# Patient Record
Sex: FEMALE | Race: WHITE | NOT HISPANIC OR LATINO | URBAN - METROPOLITAN AREA
[De-identification: names, ages, dates, MRNs, and addresses within clinical notes are randomized per-mention and may not be internally consistent; named-entity substitution may affect disease eponyms.]

---

## 2017-06-11 ENCOUNTER — EMERGENCY (EMERGENCY)
Facility: HOSPITAL | Age: 47
LOS: 1 days | Discharge: HOME | End: 2017-06-11
Attending: EMERGENCY MEDICINE | Admitting: EMERGENCY MEDICINE
Payer: COMMERCIAL

## 2017-06-11 VITALS
TEMPERATURE: 98 F | OXYGEN SATURATION: 100 % | DIASTOLIC BLOOD PRESSURE: 79 MMHG | HEART RATE: 95 BPM | RESPIRATION RATE: 18 BRPM | SYSTOLIC BLOOD PRESSURE: 145 MMHG

## 2017-06-11 LAB
ALBUMIN SERPL ELPH-MCNC: 4 G/DL — SIGNIFICANT CHANGE UP (ref 3.3–5)
ALP SERPL-CCNC: 38 U/L — LOW (ref 40–120)
ALT FLD-CCNC: 10 U/L — SIGNIFICANT CHANGE UP (ref 4–33)
APPEARANCE UR: CLEAR — SIGNIFICANT CHANGE UP
AST SERPL-CCNC: 20 U/L — SIGNIFICANT CHANGE UP (ref 4–32)
BACTERIA # UR AUTO: SIGNIFICANT CHANGE UP
BASOPHILS # BLD AUTO: 0.05 K/UL — SIGNIFICANT CHANGE UP (ref 0–0.2)
BASOPHILS NFR BLD AUTO: 0.8 % — SIGNIFICANT CHANGE UP (ref 0–2)
BASOPHILS NFR SPEC: 2.6 % — HIGH (ref 0–2)
BILIRUB SERPL-MCNC: < 0.2 MG/DL — LOW (ref 0.2–1.2)
BILIRUB UR-MCNC: NEGATIVE — SIGNIFICANT CHANGE UP
BLOOD UR QL VISUAL: HIGH
BUN SERPL-MCNC: 19 MG/DL — SIGNIFICANT CHANGE UP (ref 7–23)
CALCIUM SERPL-MCNC: 9.1 MG/DL — SIGNIFICANT CHANGE UP (ref 8.4–10.5)
CHLORIDE SERPL-SCNC: 105 MMOL/L — SIGNIFICANT CHANGE UP (ref 98–107)
CO2 SERPL-SCNC: 24 MMOL/L — SIGNIFICANT CHANGE UP (ref 22–31)
COLOR SPEC: SIGNIFICANT CHANGE UP
CREAT SERPL-MCNC: 0.75 MG/DL — SIGNIFICANT CHANGE UP (ref 0.5–1.3)
EOSINOPHIL # BLD AUTO: 0.17 K/UL — SIGNIFICANT CHANGE UP (ref 0–0.5)
EOSINOPHIL NFR BLD AUTO: 2.6 % — SIGNIFICANT CHANGE UP (ref 0–6)
EOSINOPHIL NFR FLD: 3.5 % — SIGNIFICANT CHANGE UP (ref 0–6)
GIANT PLATELETS BLD QL SMEAR: PRESENT — SIGNIFICANT CHANGE UP
GLUCOSE SERPL-MCNC: 72 MG/DL — SIGNIFICANT CHANGE UP (ref 70–99)
GLUCOSE UR-MCNC: NEGATIVE — SIGNIFICANT CHANGE UP
HCG SERPL-ACNC: < 5 MIU/ML — SIGNIFICANT CHANGE UP
HCT VFR BLD CALC: 25.6 % — LOW (ref 34.5–45)
HGB BLD-MCNC: 7.4 G/DL — LOW (ref 11.5–15.5)
HYPOCHROMIA BLD QL: SIGNIFICANT CHANGE UP
IMM GRANULOCYTES NFR BLD AUTO: 0.2 % — SIGNIFICANT CHANGE UP (ref 0–1.5)
KETONES UR-MCNC: NEGATIVE — SIGNIFICANT CHANGE UP
LEUKOCYTE ESTERASE UR-ACNC: NEGATIVE — SIGNIFICANT CHANGE UP
LYMPHOCYTES # BLD AUTO: 2.12 K/UL — SIGNIFICANT CHANGE UP (ref 1–3.3)
LYMPHOCYTES # BLD AUTO: 32.9 % — SIGNIFICANT CHANGE UP (ref 13–44)
LYMPHOCYTES NFR SPEC AUTO: 23.7 % — SIGNIFICANT CHANGE UP (ref 13–44)
MCHC RBC-ENTMCNC: 21.1 PG — LOW (ref 27–34)
MCHC RBC-ENTMCNC: 28.9 % — LOW (ref 32–36)
MCV RBC AUTO: 72.9 FL — LOW (ref 80–100)
MICROCYTES BLD QL: SIGNIFICANT CHANGE UP
MONOCYTES # BLD AUTO: 0.53 K/UL — SIGNIFICANT CHANGE UP (ref 0–0.9)
MONOCYTES NFR BLD AUTO: 8.2 % — SIGNIFICANT CHANGE UP (ref 2–14)
MONOCYTES NFR BLD: 1.8 % — LOW (ref 2–9)
MUCOUS THREADS # UR AUTO: SIGNIFICANT CHANGE UP
NEUTROPHIL AB SER-ACNC: 65.8 % — SIGNIFICANT CHANGE UP (ref 43–77)
NEUTROPHILS # BLD AUTO: 3.57 K/UL — SIGNIFICANT CHANGE UP (ref 1.8–7.4)
NEUTROPHILS NFR BLD AUTO: 55.3 % — SIGNIFICANT CHANGE UP (ref 43–77)
NITRITE UR-MCNC: NEGATIVE — SIGNIFICANT CHANGE UP
OVALOCYTES BLD QL SMEAR: SLIGHT — SIGNIFICANT CHANGE UP
PH UR: 5.5 — SIGNIFICANT CHANGE UP (ref 4.6–8)
PLATELET # BLD AUTO: 403 K/UL — HIGH (ref 150–400)
PLATELET COUNT - ESTIMATE: NORMAL — SIGNIFICANT CHANGE UP
PMV BLD: 9 FL — SIGNIFICANT CHANGE UP (ref 7–13)
POLYCHROMASIA BLD QL SMEAR: SLIGHT — SIGNIFICANT CHANGE UP
POTASSIUM SERPL-MCNC: 3.7 MMOL/L — SIGNIFICANT CHANGE UP (ref 3.5–5.3)
POTASSIUM SERPL-SCNC: 3.7 MMOL/L — SIGNIFICANT CHANGE UP (ref 3.5–5.3)
PROT SERPL-MCNC: 7 G/DL — SIGNIFICANT CHANGE UP (ref 6–8.3)
PROT UR-MCNC: NEGATIVE — SIGNIFICANT CHANGE UP
RBC # BLD: 3.51 M/UL — LOW (ref 3.8–5.2)
RBC # FLD: 19.3 % — HIGH (ref 10.3–14.5)
RBC CASTS # UR COMP ASSIST: >50 — HIGH (ref 0–?)
SODIUM SERPL-SCNC: 144 MMOL/L — SIGNIFICANT CHANGE UP (ref 135–145)
SP GR SPEC: 1.01 — SIGNIFICANT CHANGE UP (ref 1–1.03)
SQUAMOUS # UR AUTO: SIGNIFICANT CHANGE UP
UROBILINOGEN FLD QL: NORMAL E.U. — SIGNIFICANT CHANGE UP (ref 0.1–0.2)
VARIANT LYMPHS # BLD: 2.6 % — SIGNIFICANT CHANGE UP
WBC # BLD: 6.45 K/UL — SIGNIFICANT CHANGE UP (ref 3.8–10.5)
WBC # FLD AUTO: 6.45 K/UL — SIGNIFICANT CHANGE UP (ref 3.8–10.5)
WBC UR QL: SIGNIFICANT CHANGE UP (ref 0–?)

## 2017-06-11 PROCEDURE — 99284 EMERGENCY DEPT VISIT MOD MDM: CPT | Mod: 25

## 2017-06-11 PROCEDURE — 76830 TRANSVAGINAL US NON-OB: CPT | Mod: 26

## 2017-06-11 PROCEDURE — 99245 OFF/OP CONSLTJ NEW/EST HI 55: CPT | Mod: GC

## 2017-06-11 RX ORDER — SODIUM CHLORIDE 9 MG/ML
1000 INJECTION INTRAMUSCULAR; INTRAVENOUS; SUBCUTANEOUS ONCE
Qty: 0 | Refills: 0 | Status: COMPLETED | OUTPATIENT
Start: 2017-06-11 | End: 2017-06-11

## 2017-06-11 RX ADMIN — SODIUM CHLORIDE 3000 MILLILITER(S): 9 INJECTION INTRAMUSCULAR; INTRAVENOUS; SUBCUTANEOUS at 20:58

## 2017-06-11 NOTE — ED PROVIDER NOTE - ATTENDING CONTRIBUTION TO CARE
48 y/o F with history of fibroids presenting with vaginal bleeding, and symptomatic anemia, will start her on IV fluids, order labs, and consult ObGyn.

## 2017-06-11 NOTE — ED PROVIDER NOTE - NS ED MD SCRIBE ATTENDING SCRIBE SECTIONS
HIV/REVIEW OF SYSTEMS/VITAL SIGNS( Pullset)/PAST MEDICAL/SURGICAL/SOCIAL HISTORY/HISTORY OF PRESENT ILLNESS/PHYSICAL EXAM/DISPOSITION

## 2017-06-11 NOTE — ED PROVIDER NOTE - OBJECTIVE STATEMENT
46 y/o female with PMHx of fibroids p/w vaginal bleeding x 12 days. Pt states that she usually gets heavy periods and one time she was admitted for blood transfusion for heavy bleeding. Pt states she was walking today and became tired with SOB during her hike. Says similar Sxs last time she had hemoglobin of 6. Denies pelvic pain, abd pain, N/V/D, and any other complaints.

## 2017-06-11 NOTE — ED ADULT NURSE NOTE - OBJECTIVE STATEMENT
pt received to intake room 6, AAOx3, c/o vaginal bleeding x 12 days. Hx Fibroids. pt denies any chest pain/SOB/abdominal pain. respirations even and unlabored, appears comfortable. VS as noted, pt in NAD, 20G IV placed to rt AC, labs/urine sent. will continue to monitor pt.

## 2017-06-12 VITALS
HEART RATE: 72 BPM | RESPIRATION RATE: 16 BRPM | OXYGEN SATURATION: 98 % | DIASTOLIC BLOOD PRESSURE: 77 MMHG | SYSTOLIC BLOOD PRESSURE: 133 MMHG | TEMPERATURE: 98 F

## 2017-06-12 DIAGNOSIS — N93.9 ABNORMAL UTERINE AND VAGINAL BLEEDING, UNSPECIFIED: ICD-10-CM

## 2017-06-12 LAB
BLD GP AB SCN SERPL QL: NEGATIVE — SIGNIFICANT CHANGE UP
HBA1C BLD-MCNC: 5.9 % — HIGH (ref 4–5.6)
HCT VFR BLD CALC: 32.8 % — LOW (ref 34.5–45)
HGB BLD-MCNC: 10.1 G/DL — LOW (ref 11.5–15.5)
MCHC RBC-ENTMCNC: 23.4 PG — LOW (ref 27–34)
MCHC RBC-ENTMCNC: 30.8 % — LOW (ref 32–36)
MCV RBC AUTO: 76.1 FL — LOW (ref 80–100)
PLATELET # BLD AUTO: 364 K/UL — SIGNIFICANT CHANGE UP (ref 150–400)
PMV BLD: 9.9 FL — SIGNIFICANT CHANGE UP (ref 7–13)
RBC # BLD: 4.31 M/UL — SIGNIFICANT CHANGE UP (ref 3.8–5.2)
RBC # FLD: 19.3 % — HIGH (ref 10.3–14.5)
RH IG SCN BLD-IMP: POSITIVE — SIGNIFICANT CHANGE UP
WBC # BLD: 6.58 K/UL — SIGNIFICANT CHANGE UP (ref 3.8–10.5)
WBC # FLD AUTO: 6.58 K/UL — SIGNIFICANT CHANGE UP (ref 3.8–10.5)

## 2017-06-12 PROCEDURE — 99236 HOSP IP/OBS SAME DATE HI 85: CPT

## 2017-06-12 RX ORDER — TRANEXAMIC ACID 100 MG/ML
1000 INJECTION, SOLUTION INTRAVENOUS ONCE
Qty: 0 | Refills: 0 | Status: COMPLETED | OUTPATIENT
Start: 2017-06-12 | End: 2017-06-12

## 2017-06-12 RX ORDER — LEVOTHYROXINE SODIUM 125 MCG
50 TABLET ORAL DAILY
Qty: 0 | Refills: 0 | Status: DISCONTINUED | OUTPATIENT
Start: 2017-06-12 | End: 2017-06-15

## 2017-06-12 RX ADMIN — TRANEXAMIC ACID 220 MILLIGRAM(S): 100 INJECTION, SOLUTION INTRAVENOUS at 10:55

## 2017-06-12 RX ADMIN — Medication 50 MICROGRAM(S): at 06:51

## 2017-06-12 NOTE — ED ADULT NURSE REASSESSMENT NOTE - NS ED NURSE REASSESS COMMENT FT1
Patient endorsed by Lis PALUMBO. As per RN, patient A&Ox2, ambulatory, chief complaint heavy vaginal bleed x 12 days, hx of fibroids and previous hospitalization for similar symptoms which patient received blood transfusion. Patient is to received PRBC's, consent signed and awaiting for second type and screen as per report. Patient to be slotted into CDU1. Awaiting for patient arrival.

## 2017-06-12 NOTE — ED CDU PROVIDER NOTE - OBJECTIVE STATEMENT
48 y/o female with PMHx of diet controlled dm, hypothyroidism, fibroids p/w vaginal bleeding x 12 days. Pt states that she usually gets heavy periods and one time she was admitted for blood transfusion for heavy bleeding. Was scheduled for myomectomy before but due to complications never had it done. Pt states she was walking today and became tired with SOB during her hike. Says similar Sxs last time she had hemoglobin of 6. Denies headache, neck pain, f/c/n/v/d, chest pain, pelvic pain, abd pain, urinary symptoms, numbness/tingling, and any other complaints. IN Er pt found to be anemic, tvs showed fibroids, in cdu for prbc, gyn consult

## 2017-06-12 NOTE — ED CDU PROVIDER NOTE - PROGRESS NOTE DETAILS
I , Dara Polanco M.D. have examined the patient and confirmed the essential components of the history, physical examination, diagnosis, and treatment plan. I agree with the patient's care as documented by the PA and amended herein by me. See note above for complete details of service.  Dr. Hill - 48 yo F Hx DM, Hypothyroidism, Anemia/DUB 2/2 Fibroids who presented to the ED for vaginal bleeding x 12 d with DOBBS x 1 day. Pt found to be anemic to be to 7.4. US + fibroid uterus. Pt seen by GYN who recommended  Lysteda 1000mg IV x 1, can continue on (1300mg PO TID, max 5 days) if desired until bleeding stops. Pt sent to CDU for PRBCs x 2 U. On eval this am, pt reports symptomatic improvement. Reports that she only changed one pad since last night. 2 U PRBCs in progress. Will repeat CBC after 2nd until and reassess. If adequate rise and pt remains stable, will dc with GYN follow up as recommended. I , Dara Polanco M.D. have examined the patient and confirmed the essential components of the history, physical examination, diagnosis, and treatment plan. I agree with the patient's care as documented by the PA and amended herein by me. See note above for complete details of service.  Dr. Hill - 46 yo F Hx DM, Hypothyroidism, Anemia/DUB 2/2 Fibroids who presented to the ED for vaginal bleeding x 12 d with DOBBS x 1 day. Pt found to be anemic to be to 7.4. US + fibroid uterus. Pt seen by GYN who recommended  Lysteda 1000mg IV x 1, can continue on (1300mg PO TID, max 5 days) if desired until bleeding stops. Pt sent to CDU for PRBCs x 2 U. On eval this am, pt reports symptomatic improvement. Reports that she only changed one pad since last night. 2 U PRBCs in progress. Will repeat CBC after 2nd until and reassess. If adequate rise in H&H and pt remains stable, will dc with GYN follow up as recommended. CDU ATTG PROGRESS NOTE - (Dr. Hill)  I , Dara Polanco M.D. have examined the patient and confirmed the essential components of the history, physical examination, diagnosis, and treatment plan. I agree with the patient's care as documented by the PA and amended herein by me. See note above for complete details of service.  Dr. Hill - 48 yo F Hx DM, Hypothyroidism, Anemia/DUB 2/2 Fibroids who presented to the ED for vaginal bleeding x 12 d with DOBBS x 1 day. Pt found to be anemic to be to 7.4. US + fibroid uterus. Pt seen by GYN who recommended  Lysteda 1000mg IV x 1, can continue on (1300mg PO TID, max 5 days) if desired until bleeding stops. Pt sent to CDU for PRBCs x 2 U. On eval this am, pt reports symptomatic improvement. Reports that she only changed one pad since last night. 2 U PRBCs in progress. Will repeat CBC after 2nd until and reassess. If adequate rise in H&H and pt remains stable, will dc with GYN follow up as recommended. KEAGAN Barcenas: pt doing well, rpt cbc 10.1   denies cp sob dizziness weakness difficulty breathing  otherwise well  will d/c home for out patient follow up   pt agrees with plan CDU ATTG DC NOTE (Dr. Hill) - CDU ATTG DC NOTE (Dr. Hill) - Pt symptomatically improved. Repeat CBC 10/32 from 7/25. Stable for DC with outpt f/up as per GYN recs. Pt aware and agree with plan. Pt stable for discharge. PMD follow up within 24 - 48 hours. DC instructions and warning signs for return given.

## 2017-06-12 NOTE — CONSULT NOTE ADULT - ASSESSMENT
47yoF with fibroids, AUB, acute on chronic blood loss anemia, asymptomatic. 47yoF P4 with fibroids, AUB, acute on chronic blood loss anemia, likely a mix of fibroid and perimenopausal AUB, asymptomatic now

## 2017-06-12 NOTE — CONSULT NOTE ADULT - SUBJECTIVE AND OBJECTIVE BOX
GYNECOLOGY CONSULT NOTE    47y G  P  Last Menstrual Period    presents with      OB/GYN HISTORY:     Surgical History:        Past Medical History:       No Known Allergies          FAMILY HISTORY:      Social History:     REVIEW OF SYSTEMS:    CONSTITUTIONAL: No fever, weight loss, or fatigue  EYES: No eye pain, visual disturbances, or discharge  ENMT:  No difficulty hearing, tinnitus, vertigo; No sinus or throat pain  NECK: No pain or stiffness  BREASTS: No pain, masses, or nipple discharge  RESPIRATORY: No cough, wheezing, chills or hemoptysis; No shortness of breath  CARDIOVASCULAR: No chest pain, palpitations, dizziness, or leg swelling  GASTROINTESTINAL: No abdominal or epigastric pain. No nausea, vomiting, or hematemesis; No diarrhea or constipation. No melena or hematochezia.  GENITOURINARY: No dysuria, frequency, hematuria, or incontinence  NEUROLOGICAL: No headaches, memory loss, loss of strength, numbness, or tremors  SKIN: No itching, burning, rashes, or lesions   LYMPH NODES: No enlarged glands  ENDOCRINE: No heat or cold intolerance; No hair loss  MUSCULOSKELETAL: No joint pain or swelling; No muscle, back, or extremity pain  PSYCHIATRIC: No depression, anxiety, mood swings, or difficulty sleeping  HEME/LYMPH: No easy bruising, or bleeding gums  ALLERY AND IMMUNOLOGIC: No hives or eczema      MEDICATIONS  (STANDING):    MEDICATIONS  (PRN):      OBJECTIVE FINDINGS:    Vital Signs Last 24 Hrs  T(C): 36.7, Max: 36.8 (- @ 19:36)  T(F): 98, Max: 98.2 ( @ 19:36)  HR: 82 (82 - 95)  BP: 111/57 (111/57 - 145/79)  BP(mean): --  RR: 16 (16 - 18)  SpO2: 100% (100% - 100%)    PHYSICAL EXAM:    GENERAL: NAD, well-developed  HEAD:  Atraumatic, Normocephalic  EYES: EOMI, PERRLA, conjunctiva and sclera clear  ENMT: No tonsillar erythema, exudates, or enlargement; Moist mucous membranes, Good dentition, No lesions  NECK: Supple, No JVD, Normal thyroid  NERVOUS SYSTEM:  Alert & Oriented X3, Good concentration; Motor Strength 5/5 B/L upper and lower extremities; DTRs 2+ intact and symmetric  CHEST/LUNG: Clear to percussion bilaterally; No rales, rhonchi, wheezing, or rubs  HEART: Regular rate and rhythm; No murmurs, rubs, or gallops  ABDOMEN: Soft, Nontender, Nondistended; Bowel sounds present, No rebound, No guarding  EXTREMITIES:  2+ Peripheral Pulses, No clubbing, cyanosis, or edema, Logan's sign negative  LYMPH: No lymphadenopathy noted  SKIN: No rashes or lesions  PELVIC:   RECTAL:      LABS:                        7.4    6.45  )-----------( 403      ( 2017 21:00 )             25.6     06-    144  |  105  |  19  ----------------------------<  72  3.7   |  24  |  0.75    Ca    9.1      2017 21:00    TPro  7.0  /  Alb  4.0  /  TBili  < 0.2<L>  /  DBili  x   /  AST  20  /  ALT  10  /  AlkPhos  38<L>  06-11      Urinalysis Basic - ( 2017 21:00 )    Color: PLYEL / Appearance: CLEAR / S.010 / pH: 5.5  Gluc: NEGATIVE / Ketone: NEGATIVE  / Bili: NEGATIVE / Urobili: NORMAL E.U.   Blood: LARGE / Protein: NEGATIVE / Nitrite: NEGATIVE   Leuk Esterase: NEGATIVE / RBC: >50 / WBC 0-2   Sq Epi: OCC / Non Sq Epi: x / Bacteria: FEW        RADIOLOGY & ADDITIONAL STUDIES: GYNECOLOGY CONSULT NOTE    47y   LMP 17 with hx fibroids, chronic anemia, and AUB presenting with VB x 12day    presents with      OB/GYN HISTORY:     Surgical History:        Past Medical History:       No Known Allergies          FAMILY HISTORY:      Social History:     REVIEW OF SYSTEMS:    CONSTITUTIONAL: No fever, weight loss, or fatigue  EYES: No eye pain, visual disturbances, or discharge  ENMT:  No difficulty hearing, tinnitus, vertigo; No sinus or throat pain  NECK: No pain or stiffness  BREASTS: No pain, masses, or nipple discharge  RESPIRATORY: No cough, wheezing, chills or hemoptysis; No shortness of breath  CARDIOVASCULAR: No chest pain, palpitations, dizziness, or leg swelling  GASTROINTESTINAL: No abdominal or epigastric pain. No nausea, vomiting, or hematemesis; No diarrhea or constipation. No melena or hematochezia.  GENITOURINARY: No dysuria, frequency, hematuria, or incontinence  NEUROLOGICAL: No headaches, memory loss, loss of strength, numbness, or tremors  SKIN: No itching, burning, rashes, or lesions   LYMPH NODES: No enlarged glands  ENDOCRINE: No heat or cold intolerance; No hair loss  MUSCULOSKELETAL: No joint pain or swelling; No muscle, back, or extremity pain  PSYCHIATRIC: No depression, anxiety, mood swings, or difficulty sleeping  HEME/LYMPH: No easy bruising, or bleeding gums  ALLERY AND IMMUNOLOGIC: No hives or eczema      MEDICATIONS  (STANDING):    MEDICATIONS  (PRN):      OBJECTIVE FINDINGS:    Vital Signs Last 24 Hrs  T(C): 36.7, Max: 36.8 ( @ 19:36)  T(F): 98, Max: 98.2 ( @ 19:36)  HR: 82 (82 - 95)  BP: 111/57 (111/57 - 145/79)  BP(mean): --  RR: 16 (16 - 18)  SpO2: 100% (100% - 100%)    PHYSICAL EXAM:    GENERAL: NAD, well-developed  HEAD:  Atraumatic, Normocephalic  EYES: EOMI, PERRLA, conjunctiva and sclera clear  ENMT: No tonsillar erythema, exudates, or enlargement; Moist mucous membranes, Good dentition, No lesions  NECK: Supple, No JVD, Normal thyroid  NERVOUS SYSTEM:  Alert & Oriented X3, Good concentration; Motor Strength 5/5 B/L upper and lower extremities; DTRs 2+ intact and symmetric  CHEST/LUNG: Clear to percussion bilaterally; No rales, rhonchi, wheezing, or rubs  HEART: Regular rate and rhythm; No murmurs, rubs, or gallops  ABDOMEN: Soft, Nontender, Nondistended; Bowel sounds present, No rebound, No guarding  EXTREMITIES:  2+ Peripheral Pulses, No clubbing, cyanosis, or edema, Logan's sign negative  LYMPH: No lymphadenopathy noted  SKIN: No rashes or lesions  PELVIC:   RECTAL:      LABS:                        7.4    6.45  )-----------( 403      ( 2017 21:00 )             25.6     06-11    144  |  105  |  19  ----------------------------<  72  3.7   |  24  |  0.75    Ca    9.1      2017 21:00    TPro  7.0  /  Alb  4.0  /  TBili  < 0.2<L>  /  DBili  x   /  AST  20  /  ALT  10  /  AlkPhos  38<L>  06-11      Urinalysis Basic - ( 2017 21:00 )    Color: PLYEL / Appearance: CLEAR / S.010 / pH: 5.5  Gluc: NEGATIVE / Ketone: NEGATIVE  / Bili: NEGATIVE / Urobili: NORMAL E.U.   Blood: LARGE / Protein: NEGATIVE / Nitrite: NEGATIVE   Leuk Esterase: NEGATIVE / RBC: >50 / WBC 0-2   Sq Epi: OCC / Non Sq Epi: x / Bacteria: FEW        RADIOLOGY & ADDITIONAL STUDIES: GYNECOLOGY CONSULT NOTE    47y   LMP 17 with hx fibroids, chronic anemia, and AUB presenting with VB x 12day and SOB while walking today. Patient has hx of chronic anemia followed by PCP and Endocrine, baseline Hgb 10-11. Pt reported hx of prolonged menses in past with RBC transfusion once for anemia. Reports bleeding comes intermittently in gushes and occasional clots since . Reports EM Bx 2016 with simple hyperplasia, treated with progesterone with prolonged bleeding. Pt did not return for further progesterone treatment. Considering ablation vs hysterectomy, but no definitive plan yet. No hx of bleeding d/o, no anticoagulation therapy. Denies current CP, SOB, palpitations, dizziness.     Gyn: Dr Mandujano in [facility]    OB/GYN HISTORY:    x4, TOP x1, sAB 5mths x1, hx Fibroids, denies abnl paps, STI, pelvic pain    Surgical History:    D&C x2, Abdominoplasty    Past Medical History:   T2DM  Hypothyroid  no bleeding or clotting disorders    No Known Allergies    REVIEW OF SYSTEMS:    CONSTITUTIONAL: No fever, weight loss, or fatigue  EYES: No eye pain, visual disturbances, or discharge  ENMT:  No difficulty hearing, tinnitus, vertigo; No sinus or throat pain  NECK: No pain or stiffness  BREASTS: No pain, masses, or nipple discharge  RESPIRATORY: No cough, wheezing, chills or hemoptysis;   CARDIOVASCULAR: No chest pain, palpitations, dizziness, or leg swelling  GASTROINTESTINAL: No abdominal or epigastric pain. No nausea, vomiting, or hematemesis; No diarrhea or constipation. No melena or hematochezia.  GENITOURINARY: No dysuria, frequency, hematuria, or incontinence  NEUROLOGICAL: No headaches, memory loss, loss of strength, numbness, or tremors  SKIN: No itching, burning, rashes, or lesions   LYMPH NODES: No enlarged glands  ENDOCRINE: No heat or cold intolerance; No hair loss  MUSCULOSKELETAL: No joint pain or swelling; No muscle, back, or extremity pain  PSYCHIATRIC: No depression, anxiety, mood swings, or difficulty sleeping  HEME/LYMPH: No easy bruising, or bleeding gums  ALLERY AND IMMUNOLOGIC: No hives or eczema      OBJECTIVE FINDINGS:    Vital Signs Last 24 Hrs  T(C): 36.7, Max: 36.8 ( @ 19:36)  T(F): 98, Max: 98.2 ( @ 19:36)  HR: 82 (82 - 95)  BP: 111/57 (111/57 - 145/79)  BP(mean): --  RR: 16 (16 - 18)  SpO2: 100% (100% - 100%)    PHYSICAL EXAM:    GENERAL: NAD, well-developed  CHEST/LUNG: Clear to percussion bilaterally; No rales, rhonchi, wheezing, or rubs  HEART: Regular rate and rhythm; No murmurs, rubs, or gallops  ABDOMEN: Soft, Nontender, Nondistended; Bowel sounds present, No rebound, No guarding  EXTREMITIES: No clubbing, cyanosis, or edema,  PELVIC: patient refused internal and speculum exam, small spot of blood on pad      LABS:                        7.4    6.45  )-----------( 403      ( 2017 21:00 )             25.6         144  |  105  |  19  ----------------------------<  72  3.7   |  24  |  0.75    Ca    9.1      2017 21:00    TPro  7.0  /  Alb  4.0  /  TBili  < 0.2<L>  /  DBili  x   /  AST  20  /  ALT  10  /  AlkPhos  38<L>        Urinalysis Basic - ( 2017 21:00 )    Color: PLYEL / Appearance: CLEAR / S.010 / pH: 5.5  Gluc: NEGATIVE / Ketone: NEGATIVE  / Bili: NEGATIVE / Urobili: NORMAL E.U.   Blood: LARGE / Protein: NEGATIVE / Nitrite: NEGATIVE   Leuk Esterase: NEGATIVE / RBC: >50 / WBC 0-2   Sq Epi: OCC / Non Sq Epi: x / Bacteria: FEW

## 2017-06-12 NOTE — ED CDU PROVIDER NOTE - PLAN OF CARE
Follow up with your PMD for repeat blood work. Rest, drink plenty of fluids.  Advance activity as tolerated.  Continue all previously prescribed medications as directed. You can use motrin 600mg every 6-8 hours for pain or fever, and/or Tylenol 650 mg every 4 hours for pain/fever. Follow up with your primary care physician in 48-72 hours- bring copies of your results.  Return to the emergency department for chest pain, shortness of breath, dizziness, or worsening, concerning, or persistent symptoms.

## 2017-06-12 NOTE — CONSULT NOTE ADULT - PROBLEM SELECTOR RECOMMENDATION 9
- pt admitted to CDU for 2uRBC  - minimal VB on pad now  - continue pad count  - post-transfusion CBC  - Lysteda 1000mg IV x 1, can continue on (1300mg PO TID, max 5 days) if desired until bleeding stops  - follow up with Pvt Gyn in 1 week for plan for definitive treatment  Seen with Dr Dav Sanchez MD R3

## 2017-06-12 NOTE — ED CDU PROVIDER NOTE - DETAILS
vaginal bleeding, symptomatic anemia, in cdu for prbc, gy consult vaginal bleeding, symptomatic anemia, in cdu for prbc, gyn consult

## 2017-06-23 ENCOUNTER — RESULT REVIEW (OUTPATIENT)
Age: 47
End: 2017-06-23

## 2018-07-19 PROBLEM — D25.9 LEIOMYOMA OF UTERUS, UNSPECIFIED: Chronic | Status: ACTIVE | Noted: 2017-06-12

## 2018-07-23 ENCOUNTER — OUTPATIENT (OUTPATIENT)
Dept: OUTPATIENT SERVICES | Facility: HOSPITAL | Age: 48
LOS: 1 days | End: 2018-07-23
Payer: COMMERCIAL

## 2018-07-23 VITALS
WEIGHT: 172.84 LBS | TEMPERATURE: 98 F | HEART RATE: 67 BPM | SYSTOLIC BLOOD PRESSURE: 131 MMHG | OXYGEN SATURATION: 98 % | HEIGHT: 64 IN | DIASTOLIC BLOOD PRESSURE: 83 MMHG | RESPIRATION RATE: 13 BRPM

## 2018-07-23 DIAGNOSIS — Z01.818 ENCOUNTER FOR OTHER PREPROCEDURAL EXAMINATION: ICD-10-CM

## 2018-07-23 DIAGNOSIS — J38.1 POLYP OF VOCAL CORD AND LARYNX: ICD-10-CM

## 2018-07-23 DIAGNOSIS — Z98.890 OTHER SPECIFIED POSTPROCEDURAL STATES: Chronic | ICD-10-CM

## 2018-07-23 DIAGNOSIS — Z90.89 ACQUIRED ABSENCE OF OTHER ORGANS: Chronic | ICD-10-CM

## 2018-07-23 LAB
APTT BLD: 31.6 SEC — SIGNIFICANT CHANGE UP (ref 27.5–37.4)
INR BLD: 1.04 RATIO — SIGNIFICANT CHANGE UP (ref 0.88–1.16)
PROTHROM AB SERPL-ACNC: 11.5 SEC — SIGNIFICANT CHANGE UP (ref 9.8–12.7)

## 2018-07-23 PROCEDURE — 85730 THROMBOPLASTIN TIME PARTIAL: CPT

## 2018-07-23 PROCEDURE — G0463: CPT

## 2018-07-23 PROCEDURE — 36415 COLL VENOUS BLD VENIPUNCTURE: CPT

## 2018-07-23 PROCEDURE — 85610 PROTHROMBIN TIME: CPT

## 2018-07-23 NOTE — H&P PST ADULT - PROBLEM SELECTOR PLAN 1
Scheduled for direct laryngoscopy w/biopsy on 8/1/18.    PT/PTT/INR ordered.  CBC, BMP, EKG done between 6/2018 and 7/2018 in chart.  Obtain medical clearance. Take Synthroid, Pepcid with a sip of water.

## 2018-07-23 NOTE — H&P PST ADULT - PMH
Fibroids    Hypothyroidism, unspecified type    Iron deficiency anemia, unspecified iron deficiency anemia type  10 years ago  Polyp of vocal cord and larynx    Tonsillar hypertrophy  1997  Type 2 diabetes mellitus without complication, without long-term current use of insulin  1994, metformin d/c 2017, diet and exercise controlled

## 2018-07-23 NOTE — H&P PST ADULT - NSANTHOSAYNRD_GEN_A_CORE
No. RICCI screening performed.  STOP BANG Legend: 0-2 = LOW Risk; 3-4 = INTERMEDIATE Risk; 5-8 = HIGH Risk

## 2018-07-23 NOTE — H&P PST ADULT - ASSESSMENT
47 y/o female diagnosed with polyp of vocal cord and larynx. Scheduled for direct laryngoscopy w/biopsy on 8/1/18.

## 2018-07-23 NOTE — H&P PST ADULT - HISTORY OF PRESENT ILLNESS
49 y/o female presents to [facility].  She reports voice loss on and off for the past 7 months unrelated to allergies.  Referred to Dr Noriega by her doctor.  Diagnosed with polyp of vocal cord and larynx. Scheduled for direct laryngoscopy w/biopsy on 8/1/18.

## 2018-07-31 ENCOUNTER — TRANSCRIPTION ENCOUNTER (OUTPATIENT)
Age: 48
End: 2018-07-31

## 2018-08-01 ENCOUNTER — RESULT REVIEW (OUTPATIENT)
Age: 48
End: 2018-08-01

## 2018-08-01 ENCOUNTER — OUTPATIENT (OUTPATIENT)
Dept: OUTPATIENT SERVICES | Facility: HOSPITAL | Age: 48
LOS: 1 days | End: 2018-08-01
Payer: COMMERCIAL

## 2018-08-01 VITALS
RESPIRATION RATE: 16 BRPM | TEMPERATURE: 98 F | SYSTOLIC BLOOD PRESSURE: 115 MMHG | HEART RATE: 66 BPM | DIASTOLIC BLOOD PRESSURE: 80 MMHG

## 2018-08-01 VITALS
TEMPERATURE: 98 F | DIASTOLIC BLOOD PRESSURE: 85 MMHG | HEART RATE: 63 BPM | RESPIRATION RATE: 16 BRPM | OXYGEN SATURATION: 99 % | SYSTOLIC BLOOD PRESSURE: 135 MMHG

## 2018-08-01 DIAGNOSIS — Z98.890 OTHER SPECIFIED POSTPROCEDURAL STATES: Chronic | ICD-10-CM

## 2018-08-01 DIAGNOSIS — Z90.89 ACQUIRED ABSENCE OF OTHER ORGANS: Chronic | ICD-10-CM

## 2018-08-01 DIAGNOSIS — J38.1 POLYP OF VOCAL CORD AND LARYNX: ICD-10-CM

## 2018-08-01 PROCEDURE — 88305 TISSUE EXAM BY PATHOLOGIST: CPT

## 2018-08-01 PROCEDURE — 31541 LARYNSCOP W/TUMR EXC + SCOPE: CPT

## 2018-08-01 PROCEDURE — 88305 TISSUE EXAM BY PATHOLOGIST: CPT | Mod: 26

## 2018-08-01 RX ORDER — SODIUM CHLORIDE 9 MG/ML
1000 INJECTION, SOLUTION INTRAVENOUS
Qty: 0 | Refills: 0 | Status: DISCONTINUED | OUTPATIENT
Start: 2018-08-01 | End: 2018-08-01

## 2018-08-01 RX ORDER — HYDROMORPHONE HYDROCHLORIDE 2 MG/ML
0.5 INJECTION INTRAMUSCULAR; INTRAVENOUS; SUBCUTANEOUS ONCE
Qty: 0 | Refills: 0 | Status: DISCONTINUED | OUTPATIENT
Start: 2018-08-01 | End: 2018-08-01

## 2018-08-01 RX ORDER — LEVOTHYROXINE SODIUM 125 MCG
1 TABLET ORAL
Qty: 0 | Refills: 0 | COMMUNITY

## 2018-08-01 RX ORDER — ACETAMINOPHEN 500 MG
650 TABLET ORAL EVERY 6 HOURS
Qty: 0 | Refills: 0 | Status: DISCONTINUED | OUTPATIENT
Start: 2018-08-01 | End: 2018-08-16

## 2018-08-01 RX ADMIN — SODIUM CHLORIDE 50 MILLILITER(S): 9 INJECTION, SOLUTION INTRAVENOUS at 07:46

## 2018-08-01 NOTE — ASU DISCHARGE PLAN (ADULT/PEDIATRIC). - NOTIFY
Persistent Nausea and Vomiting/Unable to Urinate/Pain not relieved by Medications/Bleeding that does not stop/Fever greater than 101

## 2018-08-02 LAB — SURGICAL PATHOLOGY STUDY: SIGNIFICANT CHANGE UP

## 2021-03-16 ENCOUNTER — TRANSCRIPTION ENCOUNTER (OUTPATIENT)
Age: 51
End: 2021-03-16

## 2021-03-21 ENCOUNTER — TRANSCRIPTION ENCOUNTER (OUTPATIENT)
Age: 51
End: 2021-03-21

## 2022-03-02 NOTE — H&P PST ADULT - TRANSFUSION REACTION, PREVIOUS, PROFILE
MOM CALLING, PT STILL HAVING BAD STOMACH ACHES AND HER POOP IS SKINNY LIKE A PENCIL OR WATERY LIKE DIARRHEA. PLEASE CALL TO ADVISE @ [phone].     no

## 2022-06-29 ENCOUNTER — NON-APPOINTMENT (OUTPATIENT)
Age: 52
End: 2022-06-29

## 2024-04-27 ENCOUNTER — EMERGENCY (EMERGENCY)
Facility: HOSPITAL | Age: 54
LOS: 0 days | Discharge: LEFT BEFORE DISCHARGE | End: 2024-04-27
Payer: COMMERCIAL

## 2024-04-27 VITALS
DIASTOLIC BLOOD PRESSURE: 106 MMHG | TEMPERATURE: 98 F | OXYGEN SATURATION: 97 % | WEIGHT: 175.05 LBS | SYSTOLIC BLOOD PRESSURE: 165 MMHG | HEART RATE: 85 BPM | HEIGHT: 64 IN | RESPIRATION RATE: 19 BRPM
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
## 2024-04-27 DIAGNOSIS — R07.89 OTHER CHEST PAIN: ICD-10-CM

## 2024-04-27 DIAGNOSIS — Z53.21 PROCEDURE AND TREATMENT NOT CARRIED OUT DUE TO PATIENT LEAVING PRIOR TO BEING SEEN BY HEALTH CARE PROVIDER: ICD-10-CM
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
## 2024-04-27 DIAGNOSIS — Z98.890 OTHER SPECIFIED POSTPROCEDURAL STATES: Chronic | ICD-10-CM

## 2024-04-27 DIAGNOSIS — Z90.89 ACQUIRED ABSENCE OF OTHER ORGANS: Chronic | ICD-10-CM

## 2024-04-27 PROCEDURE — 93010 ELECTROCARDIOGRAM REPORT: CPT

## 2024-04-27 PROCEDURE — L9991: CPT
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
## 2024-04-28 PROBLEM — J35.1 HYPERTROPHY OF TONSILS: Chronic | Status: ACTIVE | Noted: 2018-07-23

## 2024-04-28 PROBLEM — J38.1 POLYP OF VOCAL CORD AND LARYNX: Chronic | Status: ACTIVE | Noted: 2018-07-23

## 2024-04-28 PROBLEM — D50.9 IRON DEFICIENCY ANEMIA, UNSPECIFIED: Chronic | Status: ACTIVE | Noted: 2018-07-23

## 2024-04-28 PROBLEM — E03.9 HYPOTHYROIDISM, UNSPECIFIED: Chronic | Status: ACTIVE | Noted: 2018-07-23

## 2024-04-28 PROBLEM — E11.9 TYPE 2 DIABETES MELLITUS WITHOUT COMPLICATIONS: Chronic | Status: ACTIVE | Noted: 2018-07-23

## 2025-06-23 NOTE — ED CDU PROVIDER NOTE - CROS ED GU ALL NEG
Learning About Lung Cancer Screening  What is screening for lung cancer?     Lung cancer screening is a way to find some lung cancers early, before a person has any symptoms of the cancer.  Lung cancer screening may help those who have the highest risk for lung cancer--people age 50 and older who are or were heavy smokers. For most people, who aren't at increased risk, screening for lung cancer probably isn't helpful.  Screening won't prevent cancer. And it may not find all lung cancers. Lung cancer screening may lower the risk of dying from lung cancer in a small number of people.  How is it done?  Lung cancer screening is done with a low-dose CT (computed tomography) scan. A CT scan uses X-rays, or radiation, to make detailed pictures of your body. Experts recommend that screening be done in medical centers that focus on finding and treating lung cancer.  Who is screening recommended for?  Lung cancer screening is recommended for people age 50 and older who are or were heavy smokers. That means people with a smoking history of at least 20 pack years. A pack year is a way to measure how heavy a smoker you are or were.  To figure out your pack years, multiply how many packs a day on average (assuming 20 cigarettes per pack) you have smoked by how many years you have smoked. For example:  If you smoked 1 pack a day for 20 years, that's 1 times 20. So you have a smoking history of 20 pack years.  If you smoked 2 packs a day for 10 years, that's 2 times 10. So you have a smoking history of 20 pack years.  Experts agree that screening is for people who have a high risk of lung cancer. But experts don't agree on what high risk means. Some say people age 50 or older with at least a 20-pack-year smoking history are high risk. Others say it's people age 55 or older with a 30-pack-year history.  To see if you could benefit from screening, first find out if you are at high risk for lung cancer. Your doctor can help you  - - -